# Patient Record
Sex: MALE | Race: WHITE | NOT HISPANIC OR LATINO | URBAN - METROPOLITAN AREA
[De-identification: names, ages, dates, MRNs, and addresses within clinical notes are randomized per-mention and may not be internally consistent; named-entity substitution may affect disease eponyms.]

---

## 2017-09-04 ENCOUNTER — EMERGENCY (EMERGENCY)
Facility: HOSPITAL | Age: 32
LOS: 0 days | Discharge: HOME | End: 2017-09-04

## 2017-09-04 DIAGNOSIS — K08.89 OTHER SPECIFIED DISORDERS OF TEETH AND SUPPORTING STRUCTURES: ICD-10-CM

## 2017-09-04 DIAGNOSIS — K04.7 PERIAPICAL ABSCESS WITHOUT SINUS: ICD-10-CM

## 2017-09-04 DIAGNOSIS — Z87.891 PERSONAL HISTORY OF NICOTINE DEPENDENCE: ICD-10-CM

## 2018-02-15 ENCOUNTER — OUTPATIENT (OUTPATIENT)
Dept: OUTPATIENT SERVICES | Facility: HOSPITAL | Age: 33
LOS: 1 days | Discharge: HOME | End: 2018-02-15

## 2018-02-16 DIAGNOSIS — Z00.00 ENCOUNTER FOR GENERAL ADULT MEDICAL EXAMINATION WITHOUT ABNORMAL FINDINGS: ICD-10-CM

## 2021-06-19 ENCOUNTER — EMERGENCY (EMERGENCY)
Facility: HOSPITAL | Age: 36
LOS: 0 days | Discharge: HOME | End: 2021-06-19
Attending: EMERGENCY MEDICINE | Admitting: EMERGENCY MEDICINE
Payer: COMMERCIAL

## 2021-06-19 VITALS
SYSTOLIC BLOOD PRESSURE: 114 MMHG | HEIGHT: 65 IN | DIASTOLIC BLOOD PRESSURE: 75 MMHG | HEART RATE: 85 BPM | RESPIRATION RATE: 20 BRPM | WEIGHT: 125 LBS | TEMPERATURE: 99 F | OXYGEN SATURATION: 99 %

## 2021-06-19 DIAGNOSIS — L29.9 PRURITUS, UNSPECIFIED: ICD-10-CM

## 2021-06-19 DIAGNOSIS — R21 RASH AND OTHER NONSPECIFIC SKIN ERUPTION: ICD-10-CM

## 2021-06-19 DIAGNOSIS — L53.9 ERYTHEMATOUS CONDITION, UNSPECIFIED: ICD-10-CM

## 2021-06-19 PROCEDURE — 99283 EMERGENCY DEPT VISIT LOW MDM: CPT

## 2021-06-19 RX ORDER — DIPHENHYDRAMINE HCL 50 MG
50 CAPSULE ORAL ONCE
Refills: 0 | Status: COMPLETED | OUTPATIENT
Start: 2021-06-19 | End: 2021-06-19

## 2021-06-19 RX ADMIN — Medication 50 MILLIGRAM(S): at 20:14

## 2021-06-19 NOTE — ED PROVIDER NOTE - CARE PROVIDER_API CALL
Michael Dennis  DERMATOLOGY  [address] [place] [id number]  Phone: [phone]  Fax: [phone]  Follow Up Time:     Mercedes Jiménez  ALLERGY AND IMMUNOLOGY  [address] [address]  Phone: [phone]  Fax: [phone]  Follow Up Time:

## 2021-06-19 NOTE — ED PROVIDER NOTE - PHYSICAL EXAMINATION
VITAL SIGNS: I have reviewed nursing notes and confirm.  CONSTITUTIONAL: Well-developed; well-nourished; in no acute distress.   SKIN: diffuse erythematous, blanching, nontender rash, no oral lesions  HEAD: Normocephalic; atraumatic.  EYES:  conjunctiva and sclera clear.  ENT: No nasal discharge; airway clear.  CARD: S1, S2 normal; no murmurs, gallops, or rubs. Regular rate and rhythm.   RESP: No wheezes, rales or rhonchi.  EXT: Normal ROM.  No clubbing, cyanosis or edema.   LYMPH: No acute cervical adenopathy.  NEURO: Alert, oriented, grossly unremarkable

## 2021-06-19 NOTE — ED PROVIDER NOTE - PATIENT PORTAL LINK FT
You can access the FollowMyHealth Patient Portal offered by [facility] by registering at the following website: [email or web address]. By joining [facility]’s FollowMyHealth portal, you will also be able to view your health information using other applications (apps) compatible with our system.

## 2021-06-19 NOTE — ED PROVIDER NOTE - NS ED ROS FT
Cardiac:  No chest pain, SOb  Respiratory:  No cough or respiratory distress. No hemoptysis. No history of asthma or RAD.  GI:  No nausea, vomiting, diarrhea or abdominal pain.  MS:  No myalgia, muscle weakness, joint pain or back pain.  Neuro:  No headache or weakness.  No LOC.  Skin:  + rash  Endocrine: No history of thyroid disease or diabetes.  Except as documented in the HPI,  all other systems are negative.

## 2021-06-19 NOTE — ED PROVIDER NOTE - OBJECTIVE STATEMENT
Pt is a 34y/o male with no sig pmhx presents today for eval diffuse erythematous itchy rash x 2 days with no aggravating/alleviating factors. Pt denies CP, SOB, new medications/food/travel, fever, chills, neck pain.

## 2021-06-19 NOTE — ED PROVIDER NOTE - CLINICAL SUMMARY MEDICAL DECISION MAKING FREE TEXT BOX
35y male with mildly pruritic rash to torso and extermities, no constitutional or  symptoms, on exam vital signs appreciated, well appearing with erythematous MP rash to torso and extremities with areas of confluence in axilla with some target lesions ?EM vs urticaria, spares palms/soles/mm, will d/c on steroids, benadryl, derm f/u. Patient counseled regarding conditions which should prompt return.

## 2024-03-06 NOTE — ED ADULT TRIAGE NOTE - HEART RATE (BEATS/MIN)
As discussed you may use Tylenol and ibuprofen for pain during the day.  You may take 2 extra-strength Tylenol (1000 mg) and 3 regular strength ibuprofen (600 mg) every 6 hours.  Use the prescribed Norco as needed for severe pain and remember that and has a dose of Tylenol in it already.  Follow up with your primary care doctor.  Return to the ED if symptoms are worsening, fail to improve or you have new and concerning symptoms.  
85